# Patient Record
Sex: FEMALE | Race: WHITE | NOT HISPANIC OR LATINO | Employment: FULL TIME | URBAN - METROPOLITAN AREA
[De-identification: names, ages, dates, MRNs, and addresses within clinical notes are randomized per-mention and may not be internally consistent; named-entity substitution may affect disease eponyms.]

---

## 2023-08-28 RX ORDER — ACETAMINOPHEN 500 MG
1 TABLET ORAL DAILY
COMMUNITY
Start: 2017-04-06

## 2023-08-28 RX ORDER — ALBUTEROL SULFATE 90 UG/1
2 AEROSOL, METERED RESPIRATORY (INHALATION)
COMMUNITY
Start: 2019-09-11

## 2023-08-28 RX ORDER — NALTREXONE 100 %
POWDER (GRAM) MISCELLANEOUS
COMMUNITY
Start: 2021-02-01

## 2023-08-28 RX ORDER — ZINC SULFATE 50(220)MG
1 CAPSULE ORAL DAILY
COMMUNITY

## 2023-08-28 RX ORDER — DIINDOLYLMETHANE 100 %
POWDER (GRAM) MISCELLANEOUS
COMMUNITY
Start: 2023-01-12 | End: 2024-01-17 | Stop reason: WASHOUT

## 2023-08-28 RX ORDER — ASCORBIC ACID 250 MG
250 TABLET ORAL DAILY
COMMUNITY

## 2023-08-28 RX ORDER — CALCIUM CARBONATE 300MG(750)
400 TABLET,CHEWABLE ORAL EVERY 12 HOURS
COMMUNITY
Start: 2017-04-06

## 2023-08-28 RX ORDER — LEVOTHYROXINE, LIOTHYRONINE 57; 13.5 UG/1; UG/1
90 TABLET ORAL DAILY
COMMUNITY
Start: 2019-03-07

## 2023-08-29 ENCOUNTER — TELEMEDICINE (OUTPATIENT)
Dept: PRIMARY CARE | Facility: CLINIC | Age: 43
End: 2023-08-29
Payer: COMMERCIAL

## 2023-08-29 DIAGNOSIS — F90.0 ATTENTION DEFICIT HYPERACTIVITY DISORDER (ADHD), PREDOMINANTLY INATTENTIVE TYPE: ICD-10-CM

## 2023-08-29 DIAGNOSIS — F34.1 PERSISTENT DEPRESSIVE DISORDER: Primary | ICD-10-CM

## 2023-08-29 PROCEDURE — 99213 OFFICE O/P EST LOW 20 MIN: CPT | Performed by: NURSE PRACTITIONER

## 2023-08-29 RX ORDER — BUPROPION HYDROCHLORIDE 150 MG/1
150 TABLET ORAL EVERY MORNING
Qty: 30 TABLET | Refills: 1 | Status: SHIPPED | OUTPATIENT
Start: 2023-08-29 | End: 2023-09-23 | Stop reason: SDUPTHER

## 2023-08-29 NOTE — PATIENT INSTRUCTIONS
Sorry for the delay today.  [facility]  Take one pill daily in the AM  Let me know in ~2-3 weeks how you are doing.    Try to keep your routine consistent as you can and be sure to leave some time for yourself every day.

## 2023-08-29 NOTE — PROGRESS NOTES
"Subjective   Patient ID: Brittney Edmonds is a 43 y.o. female who presents for DISCUSS MEDICATION    HPI Having Virtual Visit to discuss medication.  Wants to talk about possibly using Wellbutrin for ADHD and Depression.    Oldest son, Renzo left house - he is hit or miss connecting with family.    Marino graduated and is at [employer], 13 yo at [place] and struggles with depression and cutting - hospitalized in April  Still working with counselor, Maritza at [facility]  Has been for a few years.    Now going to every 2 weeks with EMDR therapy  Reports struggling with relationship with spouse.   Has been working on this but he is very hands off with the kids.    She has anxiety \"Nonstop\"  But anxiety is better than it has been  Brain feels slower  Constantly feeling overwhelmed.  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   Working on 'being intentional'  Sleeping is OK  Physically not balancing.  Meal planning is overwhelming.    Also some questions about screenings.  Not sure she wants to do Mammogram - has read that it can cause cancer.    Called  about colonoscopy  - not yet 47  Review of Systems   Psychiatric/Behavioral:  Positive for agitation and decreased concentration. The patient is hyperactive.    All other systems reviewed and are negative.      Objective   There were no vitals taken for this visit.    Physical Exam  Psychiatric:      Comments: Good eye contact, pleasant  Figity throughout Virtual Visit           Assessment/Plan   Problem List Items Addressed This Visit       Attention deficit hyperactivity disorder (ADHD), predominantly inattentive type    Persistent depressive disorder - Primary    Relevant Medications    buPROPion XL (Wellbutrin XL) 150 mg 24 hr tablet          "

## 2023-08-30 PROBLEM — F90.0 ATTENTION DEFICIT HYPERACTIVITY DISORDER (ADHD), PREDOMINANTLY INATTENTIVE TYPE: Status: ACTIVE | Noted: 2023-08-30

## 2023-08-30 PROBLEM — F34.1 PERSISTENT DEPRESSIVE DISORDER: Status: ACTIVE | Noted: 2023-08-30

## 2023-08-30 ASSESSMENT — ENCOUNTER SYMPTOMS
DECREASED CONCENTRATION: 1
AGITATION: 1
HYPERACTIVE: 1

## 2023-09-23 DIAGNOSIS — F34.1 PERSISTENT DEPRESSIVE DISORDER: ICD-10-CM

## 2023-09-23 RX ORDER — BUPROPION HYDROCHLORIDE 150 MG/1
150 TABLET ORAL EVERY MORNING
Qty: 90 TABLET | Refills: 0 | Status: SHIPPED | OUTPATIENT
Start: 2023-09-23 | End: 2023-12-26 | Stop reason: SDUPTHER

## 2023-12-26 DIAGNOSIS — F34.1 PERSISTENT DEPRESSIVE DISORDER: ICD-10-CM

## 2023-12-26 RX ORDER — BUPROPION HYDROCHLORIDE 150 MG/1
150 TABLET ORAL EVERY MORNING
Qty: 90 TABLET | Refills: 1 | Status: SHIPPED | OUTPATIENT
Start: 2023-12-26 | End: 2024-05-21

## 2024-01-11 ENCOUNTER — APPOINTMENT (OUTPATIENT)
Dept: PRIMARY CARE | Facility: CLINIC | Age: 44
End: 2024-01-11
Payer: COMMERCIAL

## 2024-01-17 ENCOUNTER — OFFICE VISIT (OUTPATIENT)
Dept: PRIMARY CARE | Facility: CLINIC | Age: 44
End: 2024-01-17
Payer: COMMERCIAL

## 2024-01-17 ENCOUNTER — LAB (OUTPATIENT)
Dept: LAB | Facility: LAB | Age: 44
End: 2024-01-17
Payer: COMMERCIAL

## 2024-01-17 VITALS
TEMPERATURE: 97.2 F | HEIGHT: 66 IN | DIASTOLIC BLOOD PRESSURE: 70 MMHG | SYSTOLIC BLOOD PRESSURE: 110 MMHG | WEIGHT: 169 LBS | BODY MASS INDEX: 27.16 KG/M2

## 2024-01-17 DIAGNOSIS — Z82.49 FAMILY HISTORY OF CORONARY ARTERY DISEASE: ICD-10-CM

## 2024-01-17 DIAGNOSIS — Z13.21 ENCOUNTER FOR VITAMIN DEFICIENCY SCREENING: ICD-10-CM

## 2024-01-17 DIAGNOSIS — Z00.00 HEALTHCARE MAINTENANCE: Primary | ICD-10-CM

## 2024-01-17 DIAGNOSIS — Z00.00 HEALTHCARE MAINTENANCE: ICD-10-CM

## 2024-01-17 PROBLEM — R74.8 ELEVATED LIVER ENZYMES: Status: ACTIVE | Noted: 2018-10-29

## 2024-01-17 PROBLEM — J98.01 ACUTE BRONCHOSPASM: Status: ACTIVE | Noted: 2023-02-13

## 2024-01-17 PROBLEM — R09.81 NASAL CONGESTION: Status: ACTIVE | Noted: 2023-02-13

## 2024-01-17 PROBLEM — J30.1 ALLERGIC RHINITIS DUE TO POLLEN: Status: ACTIVE | Noted: 2020-03-11

## 2024-01-17 PROBLEM — K90.49 FOOD INTOLERANCE IN ADULT: Status: ACTIVE | Noted: 2018-10-29

## 2024-01-17 PROBLEM — F41.9 ANXIETY: Status: ACTIVE | Noted: 2023-02-13

## 2024-01-17 PROBLEM — M25.539 WRIST JOINT PAIN: Status: ACTIVE | Noted: 2020-03-11

## 2024-01-17 PROBLEM — L65.9 ALOPECIA: Status: ACTIVE | Noted: 2020-03-11

## 2024-01-17 PROBLEM — R00.0 TACHYCARDIA: Status: ACTIVE | Noted: 2023-02-13

## 2024-01-17 PROBLEM — J02.0 STREPTOCOCCAL SORE THROAT: Status: ACTIVE | Noted: 2020-03-11

## 2024-01-17 PROBLEM — Z91.018 MULTIPLE FOOD ALLERGIES: Status: ACTIVE | Noted: 2024-01-17

## 2024-01-17 PROBLEM — R53.81 MALAISE AND FATIGUE: Status: ACTIVE | Noted: 2024-01-17

## 2024-01-17 PROBLEM — I20.9 ISCHEMIC CHEST PAIN (CMS-HCC): Status: ACTIVE | Noted: 2023-02-13

## 2024-01-17 PROBLEM — E55.9 VITAMIN D DEFICIENCY: Status: ACTIVE | Noted: 2023-02-13

## 2024-01-17 PROBLEM — J30.2 SEASONAL ALLERGIES: Status: ACTIVE | Noted: 2023-02-13

## 2024-01-17 PROBLEM — R51.9 HEADACHE: Status: ACTIVE | Noted: 2024-01-17

## 2024-01-17 PROBLEM — N61.0 INFLAMMATORY DISORDER OF BREAST: Status: ACTIVE | Noted: 2020-03-11

## 2024-01-17 PROBLEM — R93.89 ABNORMAL X-RAY EXAMINATION: Status: ACTIVE | Noted: 2023-02-13

## 2024-01-17 PROBLEM — G57.60 PLANTAR NERVE LESION: Status: ACTIVE | Noted: 2020-03-11

## 2024-01-17 PROBLEM — E27.9 DISORDER OF ADRENAL GLAND (MULTI): Status: ACTIVE | Noted: 2023-02-13

## 2024-01-17 PROBLEM — E03.9 ACQUIRED HYPOTHYROIDISM: Status: ACTIVE | Noted: 2020-03-11

## 2024-01-17 PROBLEM — I88.9 LYMPHADENITIS: Status: ACTIVE | Noted: 2020-03-11

## 2024-01-17 PROBLEM — N94.3 PREMENSTRUAL TENSION SYNDROME: Status: ACTIVE | Noted: 2020-03-11

## 2024-01-17 PROBLEM — L50.9 URTICARIA: Status: ACTIVE | Noted: 2018-10-29

## 2024-01-17 PROBLEM — R23.3 SPONTANEOUS ECCHYMOSIS: Status: ACTIVE | Noted: 2020-03-11

## 2024-01-17 PROBLEM — I49.9 IRREGULAR HEART RHYTHM: Status: ACTIVE | Noted: 2023-02-13

## 2024-01-17 PROBLEM — E72.10 DISORDER OF SULFUR-BEARING AMINO ACID METABOLISM (MULTI): Status: ACTIVE | Noted: 2018-10-29

## 2024-01-17 PROBLEM — E06.3 HASHIMOTO'S THYROIDITIS: Status: ACTIVE | Noted: 2018-10-29

## 2024-01-17 PROBLEM — Z77.018 HEAVY METAL EXPOSURE: Status: ACTIVE | Noted: 2018-10-29

## 2024-01-17 PROBLEM — R53.83 FATIGUE: Status: ACTIVE | Noted: 2020-03-11

## 2024-01-17 PROBLEM — R22.1 MASS OF NECK: Status: ACTIVE | Noted: 2023-01-16

## 2024-01-17 PROBLEM — R41.840 ATTENTION DISTURBANCE: Status: ACTIVE | Noted: 2023-02-13

## 2024-01-17 PROBLEM — F98.8 ADD (ATTENTION DEFICIT DISORDER): Status: ACTIVE | Noted: 2024-01-17

## 2024-01-17 PROBLEM — M25.569 KNEE PAIN: Status: ACTIVE | Noted: 2020-03-11

## 2024-01-17 PROBLEM — J01.90 ACUTE SINUSITIS: Status: ACTIVE | Noted: 2020-03-11

## 2024-01-17 PROBLEM — Z15.89 HETEROZYGOUS MTHFR MUTATION C677T: Status: ACTIVE | Noted: 2018-10-29

## 2024-01-17 PROBLEM — I25.9 CHRONIC ISCHEMIC HEART DISEASE: Status: ACTIVE | Noted: 2023-02-13

## 2024-01-17 PROBLEM — R06.02 SHORTNESS OF BREATH: Status: ACTIVE | Noted: 2023-02-13

## 2024-01-17 PROBLEM — R63.5 ABNORMAL WEIGHT GAIN: Status: ACTIVE | Noted: 2020-03-11

## 2024-01-17 PROBLEM — M54.2 NECK PAIN: Status: ACTIVE | Noted: 2020-03-11

## 2024-01-17 PROBLEM — H66.009 ACUTE SUPPURATIVE OTITIS MEDIA WITHOUT SPONTANEOUS RUPTURE OF EAR DRUM: Status: ACTIVE | Noted: 2020-03-11

## 2024-01-17 PROBLEM — R53.83 MALAISE AND FATIGUE: Status: ACTIVE | Noted: 2024-01-17

## 2024-01-17 PROBLEM — I73.00 RAYNAUD'S DISEASE WITHOUT GANGRENE: Status: ACTIVE | Noted: 2018-10-29

## 2024-01-17 PROBLEM — E28.0 HYPERESTROGENISM: Status: ACTIVE | Noted: 2023-02-13

## 2024-01-17 PROBLEM — R05.9 COUGH: Status: ACTIVE | Noted: 2023-02-13

## 2024-01-17 PROBLEM — E04.2 NON-TOXIC MULTINODULAR GOITER: Status: ACTIVE | Noted: 2020-03-11

## 2024-01-17 LAB
25(OH)D3 SERPL-MCNC: 40 NG/ML (ref 30–100)
ALBUMIN SERPL BCP-MCNC: 4.3 G/DL (ref 3.4–5)
ALP SERPL-CCNC: 37 U/L (ref 33–110)
ALT SERPL W P-5'-P-CCNC: 14 U/L (ref 7–45)
ANION GAP SERPL CALC-SCNC: 10 MMOL/L (ref 10–20)
AST SERPL W P-5'-P-CCNC: 14 U/L (ref 9–39)
BILIRUB SERPL-MCNC: 0.8 MG/DL (ref 0–1.2)
BUN SERPL-MCNC: 11 MG/DL (ref 6–23)
CALCIUM SERPL-MCNC: 9.3 MG/DL (ref 8.6–10.3)
CHLORIDE SERPL-SCNC: 102 MMOL/L (ref 98–107)
CHOLEST SERPL-MCNC: 180 MG/DL (ref 0–199)
CHOLESTEROL/HDL RATIO: 2.9
CO2 SERPL-SCNC: 31 MMOL/L (ref 21–32)
CREAT SERPL-MCNC: 0.63 MG/DL (ref 0.5–1.05)
EGFRCR SERPLBLD CKD-EPI 2021: >90 ML/MIN/1.73M*2
ERYTHROCYTE [DISTWIDTH] IN BLOOD BY AUTOMATED COUNT: 12.3 % (ref 11.5–14.5)
GLUCOSE SERPL-MCNC: 100 MG/DL (ref 74–99)
HCT VFR BLD AUTO: 41.9 % (ref 36–46)
HDLC SERPL-MCNC: 63 MG/DL
HGB BLD-MCNC: 13.8 G/DL (ref 12–16)
LDLC SERPL CALC-MCNC: 103 MG/DL
MCH RBC QN AUTO: 32.2 PG (ref 26–34)
MCHC RBC AUTO-ENTMCNC: 32.9 G/DL (ref 32–36)
MCV RBC AUTO: 98 FL (ref 80–100)
NON HDL CHOLESTEROL: 117 MG/DL (ref 0–149)
NRBC BLD-RTO: 0 /100 WBCS (ref 0–0)
PLATELET # BLD AUTO: 240 X10*3/UL (ref 150–450)
POTASSIUM SERPL-SCNC: 4.1 MMOL/L (ref 3.5–5.3)
PROT SERPL-MCNC: 6.8 G/DL (ref 6.4–8.2)
RBC # BLD AUTO: 4.28 X10*6/UL (ref 4–5.2)
SODIUM SERPL-SCNC: 139 MMOL/L (ref 136–145)
TRIGL SERPL-MCNC: 70 MG/DL (ref 0–149)
VLDL: 14 MG/DL (ref 0–40)
WBC # BLD AUTO: 5.3 X10*3/UL (ref 4.4–11.3)

## 2024-01-17 PROCEDURE — 36415 COLL VENOUS BLD VENIPUNCTURE: CPT

## 2024-01-17 PROCEDURE — 80061 LIPID PANEL: CPT

## 2024-01-17 PROCEDURE — 1036F TOBACCO NON-USER: CPT | Performed by: NURSE PRACTITIONER

## 2024-01-17 PROCEDURE — 99396 PREV VISIT EST AGE 40-64: CPT | Performed by: NURSE PRACTITIONER

## 2024-01-17 PROCEDURE — 93000 ELECTROCARDIOGRAM COMPLETE: CPT | Performed by: NURSE PRACTITIONER

## 2024-01-17 PROCEDURE — 85027 COMPLETE CBC AUTOMATED: CPT

## 2024-01-17 PROCEDURE — 82306 VITAMIN D 25 HYDROXY: CPT

## 2024-01-17 PROCEDURE — 80053 COMPREHEN METABOLIC PANEL: CPT

## 2024-01-17 ASSESSMENT — PATIENT HEALTH QUESTIONNAIRE - PHQ9
SUM OF ALL RESPONSES TO PHQ9 QUESTIONS 1 AND 2: 0
1. LITTLE INTEREST OR PLEASURE IN DOING THINGS: NOT AT ALL
2. FEELING DOWN, DEPRESSED OR HOPELESS: NOT AT ALL

## 2024-01-17 NOTE — PATIENT INSTRUCTIONS
Good to see you today.  Fasting labs and I will follow up.  Continue with the Buproprion.  Let me know if you need anything.

## 2024-01-17 NOTE — PROGRESS NOTES
"Subjective   Patient ID: Brittney Edmonds is a 43 y.o. female who presents for Annual Exam.    HPI   Doing well on Wellbutrin  \"Dark cloud is lifted\"  A lot more productive.  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  Daughter is also on this.    Sees Lilli Tapia, [facility]   Dr. Stroud, Endocrine - thyroid is stable  ENT  cleared her - larger thyroid  GYN - Bert  COVID  -no known +   OPHTH - [facility]  [facility] - [facility] ([facility])  Occasional palpitation     Review of Systems   Psychiatric/Behavioral:  Positive for decreased concentration.         Better with Buproprion       Objective   /70   Temp 36.2 °C (97.2 °F)   Ht 1.67 m (5' 5.75\")   Wt 76.7 kg (169 lb)   BMI 27.49 kg/m²     Physical Exam  Vitals and nursing note reviewed.   Constitutional:       Appearance: Normal appearance.   HENT:      Head: Normocephalic and atraumatic.      Right Ear: Tympanic membrane, ear canal and external ear normal.      Left Ear: Tympanic membrane, ear canal and external ear normal.      Nose: Nose normal.      Mouth/Throat:      Mouth: Mucous membranes are moist.      Pharynx: Oropharynx is clear.   Eyes:      Extraocular Movements: Extraocular movements intact.      Conjunctiva/sclera: Conjunctivae normal.      Pupils: Pupils are equal, round, and reactive to light.   Neck:      Thyroid: No thyroid mass, thyromegaly or thyroid tenderness.   Cardiovascular:      Rate and Rhythm: Normal rate and regular rhythm.      Pulses: Normal pulses.      Heart sounds: Normal heart sounds.   Pulmonary:      Effort: Pulmonary effort is normal.      Breath sounds: Normal breath sounds.   Abdominal:      General: Bowel sounds are normal.      Palpations: Abdomen is soft.   Genitourinary:     Comments: Deferred  Musculoskeletal:         General: Normal range of motion.      Cervical back: Normal range of motion and neck supple.   Skin:     General: Skin is warm.      Capillary Refill: " Capillary refill takes 2 to 3 seconds.   Neurological:      Mental Status: She is alert and oriented to person, place, and time. Mental status is at baseline.   Psychiatric:         Mood and Affect: Mood normal.         Behavior: Behavior normal.         Thought Content: Thought content normal.         Judgment: Judgment normal.         Assessment/Plan   Problem List Items Addressed This Visit             ICD-10-CM    Encounter for vitamin deficiency screening - Primary Z13.21    Relevant Orders    Vitamin D 25-Hydroxy,Total (for eval of Vitamin D levels) (Completed)    Family history of coronary artery disease Z82.49    Relevant Orders    ECG 12 lead (Clinic Performed) (Completed)

## 2024-01-22 PROBLEM — Z00.00 HEALTHCARE MAINTENANCE: Status: ACTIVE | Noted: 2024-01-22

## 2024-01-22 PROBLEM — Z82.49 FAMILY HISTORY OF CORONARY ARTERY DISEASE: Status: ACTIVE | Noted: 2024-01-22

## 2024-01-22 PROBLEM — Z13.21 ENCOUNTER FOR VITAMIN DEFICIENCY SCREENING: Status: ACTIVE | Noted: 2024-01-22

## 2024-01-22 ASSESSMENT — ENCOUNTER SYMPTOMS: DECREASED CONCENTRATION: 1

## 2024-02-06 DIAGNOSIS — R73.09 ELEVATED GLUCOSE: Primary | ICD-10-CM

## 2024-04-24 ENCOUNTER — APPOINTMENT (OUTPATIENT)
Dept: PRIMARY CARE | Facility: CLINIC | Age: 44
End: 2024-04-24
Payer: COMMERCIAL

## 2024-05-21 ENCOUNTER — OFFICE VISIT (OUTPATIENT)
Dept: PRIMARY CARE | Facility: CLINIC | Age: 44
End: 2024-05-21
Payer: COMMERCIAL

## 2024-05-21 ENCOUNTER — LAB (OUTPATIENT)
Dept: LAB | Facility: LAB | Age: 44
End: 2024-05-21
Payer: COMMERCIAL

## 2024-05-21 VITALS
SYSTOLIC BLOOD PRESSURE: 122 MMHG | HEIGHT: 66 IN | DIASTOLIC BLOOD PRESSURE: 80 MMHG | TEMPERATURE: 98 F | WEIGHT: 166 LBS | BODY MASS INDEX: 26.68 KG/M2

## 2024-05-21 DIAGNOSIS — R10.32 LEFT LOWER QUADRANT ABDOMINAL PAIN: Primary | ICD-10-CM

## 2024-05-21 DIAGNOSIS — R73.03 PREDIABETES: ICD-10-CM

## 2024-05-21 DIAGNOSIS — R73.09 ELEVATED GLUCOSE: ICD-10-CM

## 2024-05-21 DIAGNOSIS — R10.2 PELVIC PAIN: ICD-10-CM

## 2024-05-21 DIAGNOSIS — R30.0 DYSURIA: ICD-10-CM

## 2024-05-21 PROBLEM — R22.1 NECK SWELLING: Status: ACTIVE | Noted: 2023-01-16

## 2024-05-21 PROBLEM — M79.673 PAIN OF FOOT: Status: ACTIVE | Noted: 2024-05-21

## 2024-05-21 PROBLEM — J32.9 SINUSITIS: Status: ACTIVE | Noted: 2024-05-21

## 2024-05-21 LAB
POC APPEARANCE, URINE: CLEAR
POC BILIRUBIN, URINE: NEGATIVE
POC BLOOD, URINE: NEGATIVE
POC COLOR, URINE: YELLOW
POC GLUCOSE, URINE: NEGATIVE MG/DL
POC KETONES, URINE: NEGATIVE MG/DL
POC LEUKOCYTES, URINE: NEGATIVE
POC NITRITE,URINE: NEGATIVE
POC PH, URINE: 8 PH
POC PROTEIN, URINE: NEGATIVE MG/DL
POC SPECIFIC GRAVITY, URINE: 1.02
POC UROBILINOGEN, URINE: 0.2 EU/DL

## 2024-05-21 PROCEDURE — 81003 URINALYSIS AUTO W/O SCOPE: CPT | Performed by: NURSE PRACTITIONER

## 2024-05-21 PROCEDURE — 83036 HEMOGLOBIN GLYCOSYLATED A1C: CPT

## 2024-05-21 PROCEDURE — 36415 COLL VENOUS BLD VENIPUNCTURE: CPT

## 2024-05-21 PROCEDURE — 99214 OFFICE O/P EST MOD 30 MIN: CPT | Performed by: NURSE PRACTITIONER

## 2024-05-21 RX ORDER — PROGESTERONE, MICRONIZED 100 %
POWDER (GRAM) MISCELLANEOUS
COMMUNITY
Start: 2024-01-18

## 2024-05-21 RX ORDER — BLOOD-GLUCOSE SENSOR
EACH MISCELLANEOUS
Qty: 4 EACH | Refills: 3 | Status: SHIPPED | OUTPATIENT
Start: 2024-05-21

## 2024-05-21 ASSESSMENT — PATIENT HEALTH QUESTIONNAIRE - PHQ9
SUM OF ALL RESPONSES TO PHQ9 QUESTIONS 1 AND 2: 0
2. FEELING DOWN, DEPRESSED OR HOPELESS: NOT AT ALL
1. LITTLE INTEREST OR PLEASURE IN DOING THINGS: NOT AT ALL

## 2024-05-21 NOTE — PROGRESS NOTES
"Subjective   Patient ID: Brittney Edmonds is a 43 y.o. female who presents for Abdominal Pain (Left side abd pain/pelvic since October).    HPI   Since October has had pain in left lower quadrant.  Seems to be muscular  Left lower quadrant    \"Burning and sometimes sharp, Sometimes at rest\"  Has awakened from sleep  Wants to be sure it is not an ovary or hernia  Physical Therapy helped some   Did cupping  Some exercises to help strengthen  BM was uncomfortable at times. Usual 1-2 per day.  Last GYN visit last May -everything OK  Hx ovarian cysts  16 years ago  Menses pretty regular but changing.  Was 28-30 days and now 24-32  Last cycle was a little heavier.  OK with workout  Has never had a kidney stone.  LMP- 4/28  Was mowing the grass and had episode  + family hx of DM   Elevated glucose per labs  Would like Rx for CGM - found a deal for this    Review of Systems   Constitutional:  Positive for activity change.   Gastrointestinal:  Positive for abdominal distention.   Genitourinary:  Positive for menstrual problem and pelvic pain. Negative for dysuria, flank pain and frequency.   All other systems reviewed and are negative.    Objective   /80   Temp 36.7 °C (98 °F)   Ht 1.664 m (5' 5.5\")   Wt 75.3 kg (166 lb)   BMI 27.20 kg/m²     Physical Exam  Vitals and nursing note reviewed.   Constitutional:       Appearance: Normal appearance.   HENT:      Head: Normocephalic and atraumatic.      Mouth/Throat:      Pharynx: Oropharynx is clear.   Abdominal:      General: Bowel sounds are normal.      Palpations: Abdomen is soft. There is no mass.      Tenderness: There is no rebound.   Genitourinary:     Comments: Pain at pelvic area on left - medial to hip  Diffuse and non-specific  Skin:     Findings: No lesion or rash.   Neurological:      Mental Status: She is alert and oriented to person, place, and time.   Psychiatric:         Mood and Affect: Mood normal.         Behavior: Behavior normal.         Thought " Content: Thought content normal.         Judgment: Judgment normal.         Assessment/Plan   Problem List Items Addressed This Visit             ICD-10-CM    Dysuria R30.0    Left lower quadrant abdominal pain - Primary R10.32    Relevant Orders    POCT UA Automated manually resulted (Completed)    Pelvic pain R10.2    Relevant Orders    US pelvis (Completed)    Prediabetes R73.03    Relevant Medications    FreeStyle Donita 3 Sensor device

## 2024-05-22 LAB
EST. AVERAGE GLUCOSE BLD GHB EST-MCNC: 80 MG/DL
HBA1C MFR BLD: 4.4 %

## 2024-05-24 ENCOUNTER — HOSPITAL ENCOUNTER (OUTPATIENT)
Dept: RADIOLOGY | Facility: CLINIC | Age: 44
Discharge: HOME | End: 2024-05-24
Payer: COMMERCIAL

## 2024-05-24 DIAGNOSIS — R10.2 PELVIC PAIN: ICD-10-CM

## 2024-05-24 PROCEDURE — 76830 TRANSVAGINAL US NON-OB: CPT | Performed by: RADIOLOGY

## 2024-05-24 PROCEDURE — 76856 US EXAM PELVIC COMPLETE: CPT | Performed by: RADIOLOGY

## 2024-05-24 PROCEDURE — 76856 US EXAM PELVIC COMPLETE: CPT

## 2024-05-29 ENCOUNTER — TELEPHONE (OUTPATIENT)
Dept: PRIMARY CARE | Facility: CLINIC | Age: 44
End: 2024-05-29
Payer: COMMERCIAL

## 2024-05-29 NOTE — TELEPHONE ENCOUNTER
PT'S INSURANCE HAS COME BACK WITH A DENIAL ON HER PRIOR AUTHORIZATION FOR THE   FREE STYLE MUMTAZ 3 SENSOR DEVICE.    PLEASE REVIEW AND ADVISE

## 2024-06-06 ENCOUNTER — TELEPHONE (OUTPATIENT)
Dept: PRIMARY CARE | Facility: CLINIC | Age: 44
End: 2024-06-06
Payer: COMMERCIAL

## 2024-06-06 NOTE — TELEPHONE ENCOUNTER
----- Message from Ewa Campos MA sent at 6/5/2024  8:02 AM EDT -----  Regarding: FW: Left abdominal discomfort  Contact: [phone]    ----- Message -----  From: Grey[employer] MANLEY  Sent: 6/5/2024   2:29 AM EDT  To: [place] [id number] Clinical Support Staff  Subject: Left abdominal discomfort                        Kishore Del Rio,    I was able to meeting with GYN who is unconcerned with the cyst.  I will have another scan done on July 2 to  make sure it’s nothing to be concerned with.      My anxiety is a bit better but I do feel like something is off.  I think part of this is definitely muscular and I have an appt for deep tissue manipulation.  Something doesn’t feel right and I think I’ve been able to pinpoint the other burning/sharper pain is in my descending colon or on the outside of it.  When I experience stool going through it or even gas that expands it, it feels like a burning/sharper experience.  Not like a normal gas bubble.  Like an irritation pain- and also sharp pains there so much so it wakes me in the middle of the night.  It’s different than a normal gas pain or stomach cramp. Once it seems to pass through I only get random sharp pains that last seconds through the day.     I don’t know what to do from here. I really would like additional tests/imaging as I know this isn’t in my head and something is off and not right.  I was woken up by it happening about 30 min ago. I would say the location is  between my rib and hip, left side about 4 inches away from my belly button.      Any help, or thoughts would be greatly appreciated!    Thank you,    Brittney

## 2024-06-06 NOTE — TELEPHONE ENCOUNTER
Call to pt and discussed sx and results.  Will have deep tissue massage with PT and follow up U/S in early July.  OB is not worried.    Reassured pt encouraged self-care that has been helpful in the past.   Patient completed scans ordered by Dr Carter, results stable.    Per Dr Carter patient should be started on Dexamethasone, start with a 4mg loading dose today then tomorrow start 2mg twice a day.    Call to patient with instructions, script sent to patient's pharmacy.    Per Dr Carter call to patient on Friday 6/22/18 to reassess.    Writer will be out of office on Friday, patient aware to expect a call from Dr Carter's team.    Routing to Dr Carter nurse pool.

## 2024-06-06 NOTE — TELEPHONE ENCOUNTER
----- Message from Brittney Edmonds sent at 5/31/2024  1:55 AM EDT -----  Regarding: Blood sugars  Contact: [phone]  Hi Eliane,    I got your message, thank you.  I tried a vistaril tonight and it’s not helping with the anxiety.  I’ve been on edge all night.  My concern is how long I’ve had this pain 7 months of this.  That it’s intermittent and all over my left side.  Not in the same spot each time.  Today I had a lot of pings of pain on that side so this anxiety is so debilitating.  From my rib, all the way down to my hip…different spot each ping of pain.  If it was muscular would it hurt all the time and in the same spot?  This is why I was wanting additional testing.  The pain is just not in my pelvis.  It moves all around on that left side.    I’m so very concerned about the findings saying about the cyst of something in the peripheral.    I’m struggling to eat.  I’m having hot/cold sweats. I’m sure just making myself sick.  I have an appt with gyn on Tuesday.  I’ve been trying to pull myself out of this spiral and keep myself busy.  At this point I’m willing to try anything.

## 2024-06-24 PROBLEM — R30.0 DYSURIA: Status: ACTIVE | Noted: 2024-06-24

## 2024-06-24 PROBLEM — R10.32 LEFT LOWER QUADRANT ABDOMINAL PAIN: Status: ACTIVE | Noted: 2024-06-24

## 2024-06-24 PROBLEM — R73.03 PREDIABETES: Status: ACTIVE | Noted: 2024-06-24

## 2024-06-24 PROBLEM — R10.2 PELVIC PAIN: Status: ACTIVE | Noted: 2024-06-24

## 2024-06-24 ASSESSMENT — ENCOUNTER SYMPTOMS
ABDOMINAL DISTENTION: 1
FREQUENCY: 0
ACTIVITY CHANGE: 1
DYSURIA: 0
FLANK PAIN: 0

## 2024-07-21 DIAGNOSIS — F34.1 PERSISTENT DEPRESSIVE DISORDER: ICD-10-CM

## 2024-07-24 RX ORDER — BUPROPION HYDROCHLORIDE 150 MG/1
150 TABLET ORAL EVERY MORNING
Qty: 90 TABLET | Refills: 1 | Status: SHIPPED | OUTPATIENT
Start: 2024-07-24

## 2024-10-16 ENCOUNTER — OFFICE VISIT (OUTPATIENT)
Dept: PRIMARY CARE | Facility: CLINIC | Age: 44
End: 2024-10-16
Payer: COMMERCIAL

## 2024-10-16 VITALS
OXYGEN SATURATION: 99 % | SYSTOLIC BLOOD PRESSURE: 100 MMHG | HEART RATE: 99 BPM | TEMPERATURE: 97.7 F | DIASTOLIC BLOOD PRESSURE: 82 MMHG | WEIGHT: 168 LBS | BODY MASS INDEX: 27.53 KG/M2

## 2024-10-16 DIAGNOSIS — M89.8X1 PAIN OF RIGHT SCAPULA: Primary | ICD-10-CM

## 2024-10-16 PROCEDURE — 1036F TOBACCO NON-USER: CPT | Performed by: NURSE PRACTITIONER

## 2024-10-16 PROCEDURE — 99214 OFFICE O/P EST MOD 30 MIN: CPT | Performed by: NURSE PRACTITIONER

## 2024-10-16 RX ORDER — CYCLOBENZAPRINE HCL 5 MG
5 TABLET ORAL NIGHTLY PRN
Qty: 10 TABLET | Refills: 0 | Status: SHIPPED | OUTPATIENT
Start: 2024-10-16 | End: 2024-10-26

## 2024-10-16 RX ORDER — PREDNISONE 20 MG/1
TABLET ORAL
Qty: 12 TABLET | Refills: 0 | Status: SHIPPED | OUTPATIENT
Start: 2024-10-16 | End: 2024-10-21

## 2024-10-16 ASSESSMENT — ENCOUNTER SYMPTOMS
FEVER: 0
WEAKNESS: 1
NUMBNESS: 0
NECK PAIN: 1
CHILLS: 0
FATIGUE: 0
BACK PAIN: 1

## 2024-10-16 ASSESSMENT — PATIENT HEALTH QUESTIONNAIRE - PHQ9
1. LITTLE INTEREST OR PLEASURE IN DOING THINGS: NOT AT ALL
SUM OF ALL RESPONSES TO PHQ9 QUESTIONS 1 AND 2: 0
2. FEELING DOWN, DEPRESSED OR HOPELESS: NOT AT ALL

## 2024-10-16 NOTE — PATIENT INSTRUCTIONS
Take the Prednisone as directed with food.   Muscle relaxer at bedtime- no driving or alcohol when taking.  Continue to work with PT.  Lidocaine patches as needed.  If no improvement will proceed with x-ray of cervical spine.

## 2024-10-16 NOTE — PROGRESS NOTES
Subjective   Brittney Edmonds is a 44 y.o. female who presents for Spasms (Started 2 weeks ago- has had spasms in her right arm and right side of back and right /Tried laser therapy, red light therapy, massage and needle therapy. Taken Advil but has not touch her issues.) and Flu Vaccine (Pt denied).    HPI  She presents to the office today for evaluation of right side back pain/scapula into the arm.   Reports thought she slept funny on it about 2 months ago and the scapula pain started but much more mild at that time.   Laid on something hard under her neck 2 weeks ago which flared the symptoms even more.  When turning head to the right can feel it pulling down into the scapula.  (+) radiation of pin down into the right arm (triceps region)  No numbness or tingling.  ? Weakness with certain movements  Okay usually in the morning   Movement makes it worse later in the day.  Dry needling done with PT last week. This helped the trapezius.  Reports she sees a chiropractor- holds tension and stress in shoulders and neck.  Also follows with therapist- has not had session given pain has been severe.  At times will wake up at night in an uncomfortable position and has to change positions.  No fever or chills.   Feeling good otherwise.    Has tried Ibuprofen 600 mg every 6 hours for a week and no help. Alternating with Tylenol  Tried Tiger balm and Lidocaine patches.  Tried dry needling.  Discussed an x-ray of the cervical spine today- she would like to hold off for right now.    Review of Systems   Constitutional:  Negative for chills, fatigue and fever.   Musculoskeletal:  Positive for back pain and neck pain.   Neurological:  Positive for weakness. Negative for numbness.     Objective   /82 (BP Location: Left arm, Patient Position: Sitting)   Pulse 99   Temp 36.5 °C (97.7 °F) (Temporal)   Wt 76.2 kg (168 lb)   SpO2 99%   BMI 27.53 kg/m²     Physical Exam  Constitutional:       General: She is not in acute  distress.     Appearance: Normal appearance. She is not toxic-appearing.   Cardiovascular:      Rate and Rhythm: Normal rate and regular rhythm.      Heart sounds: Normal heart sounds, S1 normal and S2 normal.   Pulmonary:      Effort: Pulmonary effort is normal.      Breath sounds: Normal breath sounds and air entry.   Musculoskeletal:        Arms:       Right lower leg: No edema.      Left lower leg: No edema.      Comments: (+)TTP noted in area above.  (+) weakness noted in right triceps with arm extension  Sensation intact  Radial pulses 2+ bilaterally     Lymphadenopathy:      Cervical: No cervical adenopathy.   Neurological:      Mental Status: She is alert and oriented to person, place, and time.      Sensory: Sensation is intact.      Deep Tendon Reflexes: Reflexes are normal and symmetric.   Psychiatric:         Mood and Affect: Mood normal.         Behavior: Behavior normal.         Thought Content: Thought content normal.         Judgment: Judgment normal.         Assessment/Plan   Problem List Items Addressed This Visit    None  Visit Diagnoses       Pain of right scapula    -  Primary    Relevant Medications    cyclobenzaprine (Flexeril) 5 mg tablet    predniSONE (Deltasone) 20 mg tablet          Prednisone as directed with food.   Muscle relaxer at bedtime- no driving or alcohol when taking.  Continue to work with PT.  Lidocaine patches as needed.  If no improvement will proceed with x-ray of cervical spine.    It has been a pleasure seeing you today!

## 2024-10-25 ENCOUNTER — TELEPHONE (OUTPATIENT)
Dept: PRIMARY CARE | Facility: CLINIC | Age: 44
End: 2024-10-25
Payer: COMMERCIAL

## 2024-10-25 DIAGNOSIS — J98.01 ACUTE BRONCHOSPASM: Primary | ICD-10-CM

## 2024-10-25 RX ORDER — ALBUTEROL SULFATE 0.83 MG/ML
2.5 SOLUTION RESPIRATORY (INHALATION) 4 TIMES DAILY PRN
Qty: 75 ML | Refills: 3 | Status: SHIPPED | OUTPATIENT
Start: 2024-10-25 | End: 2025-10-25

## 2024-10-25 RX ORDER — SODIUM CHLORIDE FOR INHALATION 3 %
4 VIAL, NEBULIZER (ML) INHALATION AS NEEDED
Qty: 100 ML | Refills: 3 | Status: SHIPPED | OUTPATIENT
Start: 2024-10-25 | End: 2025-10-25

## 2024-12-28 DIAGNOSIS — F34.1 PERSISTENT DEPRESSIVE DISORDER: ICD-10-CM

## 2024-12-30 RX ORDER — BUPROPION HYDROCHLORIDE 150 MG/1
150 TABLET ORAL EVERY MORNING
Qty: 90 TABLET | Refills: 1 | Status: SHIPPED | OUTPATIENT
Start: 2024-12-30

## 2025-01-30 DIAGNOSIS — F34.1 PERSISTENT DEPRESSIVE DISORDER: ICD-10-CM

## 2025-01-30 RX ORDER — BUPROPION HYDROCHLORIDE 150 MG/1
150 TABLET ORAL EVERY MORNING
Qty: 90 TABLET | Refills: 1 | Status: SHIPPED | OUTPATIENT
Start: 2025-01-30

## 2025-02-10 ENCOUNTER — APPOINTMENT (OUTPATIENT)
Dept: PRIMARY CARE | Facility: CLINIC | Age: 45
End: 2025-02-10
Payer: COMMERCIAL